# Patient Record
Sex: FEMALE | Race: WHITE | NOT HISPANIC OR LATINO | Employment: UNEMPLOYED | URBAN - METROPOLITAN AREA
[De-identification: names, ages, dates, MRNs, and addresses within clinical notes are randomized per-mention and may not be internally consistent; named-entity substitution may affect disease eponyms.]

---

## 2020-06-03 ENCOUNTER — HOSPITAL ENCOUNTER (INPATIENT)
Facility: HOSPITAL | Age: 30
LOS: 3 days | Discharge: HOME | End: 2020-06-06
Attending: OBSTETRICS & GYNECOLOGY | Admitting: OBSTETRICS & GYNECOLOGY

## 2020-06-03 ENCOUNTER — ANESTHESIA (OUTPATIENT)
Dept: LABOR AND DELIVERY | Facility: HOSPITAL | Age: 30
End: 2020-06-03

## 2020-06-03 ENCOUNTER — ANESTHESIA EVENT (OUTPATIENT)
Dept: LABOR AND DELIVERY | Facility: HOSPITAL | Age: 30
End: 2020-06-03

## 2020-06-03 PROBLEM — Z34.90 TERM PREGNANCY: Status: ACTIVE | Noted: 2020-06-03

## 2020-06-03 LAB
ABO GROUP BLD: NORMAL
BASOPHILS # BLD AUTO: 0 10*3/MM3 (ref 0–0.2)
BASOPHILS NFR BLD AUTO: 0.2 % (ref 0–1.5)
BLD GP AB SCN SERPL QL: POSITIVE
DEPRECATED RDW RBC AUTO: 41.1 FL (ref 37–54)
EOSINOPHIL # BLD AUTO: 0 10*3/MM3 (ref 0–0.4)
EOSINOPHIL NFR BLD AUTO: 0.5 % (ref 0.3–6.2)
ERYTHROCYTE [DISTWIDTH] IN BLOOD BY AUTOMATED COUNT: 13.2 % (ref 12.3–15.4)
HCT VFR BLD AUTO: 38.4 % (ref 34–46.6)
HGB BLD-MCNC: 13.2 G/DL (ref 12–15.9)
LYMPHOCYTES # BLD AUTO: 1.9 10*3/MM3 (ref 0.7–3.1)
LYMPHOCYTES NFR BLD AUTO: 20.2 % (ref 19.6–45.3)
MCH RBC QN AUTO: 30.1 PG (ref 26.6–33)
MCHC RBC AUTO-ENTMCNC: 34.4 G/DL (ref 31.5–35.7)
MCV RBC AUTO: 87.6 FL (ref 79–97)
MONOCYTES # BLD AUTO: 0.6 10*3/MM3 (ref 0.1–0.9)
MONOCYTES NFR BLD AUTO: 6.7 % (ref 5–12)
NEUTROPHILS # BLD AUTO: 6.7 10*3/MM3 (ref 1.7–7)
NEUTROPHILS NFR BLD AUTO: 72.4 % (ref 42.7–76)
NRBC BLD AUTO-RTO: 0 /100 WBC (ref 0–0.2)
PLATELET # BLD AUTO: 327 10*3/MM3 (ref 140–450)
PMV BLD AUTO: 7.3 FL (ref 6–12)
RBC # BLD AUTO: 4.39 10*6/MM3 (ref 3.77–5.28)
RESIDUAL RHIG DETECTED: NORMAL
RH BLD: NEGATIVE
RPR SER QL: NORMAL
T&S EXPIRATION DATE: NORMAL
WBC NRBC COR # BLD: 9.3 10*3/MM3 (ref 3.4–10.8)

## 2020-06-03 PROCEDURE — 25010000002 PENICILLIN G POTASSIUM PER 600000 UNITS: Performed by: OBSTETRICS & GYNECOLOGY

## 2020-06-03 PROCEDURE — 86900 BLOOD TYPING SEROLOGIC ABO: CPT

## 2020-06-03 PROCEDURE — 86901 BLOOD TYPING SEROLOGIC RH(D): CPT | Performed by: OBSTETRICS & GYNECOLOGY

## 2020-06-03 PROCEDURE — C1755 CATHETER, INTRASPINAL: HCPCS | Performed by: ANESTHESIOLOGY

## 2020-06-03 PROCEDURE — 86850 RBC ANTIBODY SCREEN: CPT | Performed by: OBSTETRICS & GYNECOLOGY

## 2020-06-03 PROCEDURE — 86900 BLOOD TYPING SEROLOGIC ABO: CPT | Performed by: OBSTETRICS & GYNECOLOGY

## 2020-06-03 PROCEDURE — 86592 SYPHILIS TEST NON-TREP QUAL: CPT | Performed by: OBSTETRICS & GYNECOLOGY

## 2020-06-03 PROCEDURE — 86870 RBC ANTIBODY IDENTIFICATION: CPT | Performed by: OBSTETRICS & GYNECOLOGY

## 2020-06-03 PROCEDURE — 86901 BLOOD TYPING SEROLOGIC RH(D): CPT

## 2020-06-03 PROCEDURE — 85025 COMPLETE CBC W/AUTO DIFF WBC: CPT | Performed by: OBSTETRICS & GYNECOLOGY

## 2020-06-03 RX ORDER — OXYTOCIN-SODIUM CHLORIDE 0.9% IV SOLN 30 UNIT/500ML 30-0.9/5 UT/ML-%
2 SOLUTION INTRAVENOUS
Status: DISCONTINUED | OUTPATIENT
Start: 2020-06-03 | End: 2020-06-04

## 2020-06-03 RX ORDER — SODIUM CHLORIDE 0.9 % (FLUSH) 0.9 %
3-10 SYRINGE (ML) INJECTION AS NEEDED
Status: DISCONTINUED | OUTPATIENT
Start: 2020-06-03 | End: 2020-06-04

## 2020-06-03 RX ORDER — ACETAMINOPHEN 325 MG/1
650 TABLET ORAL EVERY 4 HOURS PRN
Status: DISCONTINUED | OUTPATIENT
Start: 2020-06-03 | End: 2020-06-04

## 2020-06-03 RX ORDER — ONDANSETRON 4 MG/1
4 TABLET, FILM COATED ORAL EVERY 6 HOURS PRN
Status: DISCONTINUED | OUTPATIENT
Start: 2020-06-03 | End: 2020-06-04

## 2020-06-03 RX ORDER — MAGNESIUM CARB/ALUMINUM HYDROX 105-160MG
30 TABLET,CHEWABLE ORAL ONCE
Status: COMPLETED | OUTPATIENT
Start: 2020-06-03 | End: 2020-06-04

## 2020-06-03 RX ORDER — LIDOCAINE HYDROCHLORIDE 10 MG/ML
5 INJECTION, SOLUTION EPIDURAL; INFILTRATION; INTRACAUDAL; PERINEURAL AS NEEDED
Status: DISCONTINUED | OUTPATIENT
Start: 2020-06-03 | End: 2020-06-04

## 2020-06-03 RX ORDER — SODIUM CHLORIDE, SODIUM LACTATE, POTASSIUM CHLORIDE, CALCIUM CHLORIDE 600; 310; 30; 20 MG/100ML; MG/100ML; MG/100ML; MG/100ML
125 INJECTION, SOLUTION INTRAVENOUS CONTINUOUS
Status: DISCONTINUED | OUTPATIENT
Start: 2020-06-03 | End: 2020-06-04

## 2020-06-03 RX ORDER — BUTORPHANOL TARTRATE 1 MG/ML
1 INJECTION, SOLUTION INTRAMUSCULAR; INTRAVENOUS
Status: DISCONTINUED | OUTPATIENT
Start: 2020-06-03 | End: 2020-06-04

## 2020-06-03 RX ORDER — ONDANSETRON 2 MG/ML
4 INJECTION INTRAMUSCULAR; INTRAVENOUS EVERY 6 HOURS PRN
Status: DISCONTINUED | OUTPATIENT
Start: 2020-06-03 | End: 2020-06-04

## 2020-06-03 RX ORDER — ROPIVACAINE HYDROCHLORIDE 2 MG/ML
INJECTION, SOLUTION EPIDURAL; INFILTRATION; PERINEURAL
Status: DISPENSED
Start: 2020-06-03 | End: 2020-06-04

## 2020-06-03 RX ORDER — MORPHINE SULFATE 4 MG/ML
4 INJECTION, SOLUTION INTRAMUSCULAR; INTRAVENOUS
Status: DISCONTINUED | OUTPATIENT
Start: 2020-06-03 | End: 2020-06-04

## 2020-06-03 RX ORDER — SODIUM CHLORIDE 0.9 % (FLUSH) 0.9 %
3 SYRINGE (ML) INJECTION EVERY 12 HOURS SCHEDULED
Status: DISCONTINUED | OUTPATIENT
Start: 2020-06-03 | End: 2020-06-04

## 2020-06-03 RX ADMIN — Medication 3 ML: at 20:19

## 2020-06-03 RX ADMIN — OXYTOCIN 2 MILLI-UNITS/MIN: 10 INJECTION, SOLUTION INTRAMUSCULAR; INTRAVENOUS at 19:03

## 2020-06-03 RX ADMIN — SODIUM CHLORIDE, SODIUM LACTATE, POTASSIUM CHLORIDE, AND CALCIUM CHLORIDE 125 ML/HR: 600; 310; 30; 20 INJECTION, SOLUTION INTRAVENOUS at 13:30

## 2020-06-03 RX ADMIN — SODIUM CHLORIDE 5 MILLION UNITS: 900 INJECTION INTRAVENOUS at 20:11

## 2020-06-03 RX ADMIN — DINOPROSTONE 10 MG: 10 INSERT VAGINAL at 12:42

## 2020-06-03 RX ADMIN — SODIUM CHLORIDE, SODIUM LACTATE, POTASSIUM CHLORIDE, AND CALCIUM CHLORIDE 125 ML/HR: 600; 310; 30; 20 INJECTION, SOLUTION INTRAVENOUS at 16:01

## 2020-06-03 NOTE — PROGRESS NOTES
" Te  Obstetric Progress Note    Subjective   Feels mild contractions.  Cervidil has been in for approximately 5 hours.    Objective     Vitals:  Vitals:    06/03/20 1200 06/03/20 1400 06/03/20 1600 06/03/20 1700   BP: 124/70  99/55 119/59   Pulse: 91 78 97 94   Temp:       TempSrc:       SpO2:       Weight:       Height:         Flowsheet Rows      First Filed Value   Admission Height  165.1 cm (65\") Documented at 06/03/2020 1154   Admission Weight  131 kg (288 lb 12.8 oz) Documented at 06/03/2020 1154        No intake or output data in the 24 hours ending 06/03/20 1840    Fetal Heart Rate Assessment:   Category 1  [place]:  Irregular    Physical Exam:  General: Patient is in no acute distress    Pelvic Exam: 50%/2  -2.  AROM clear fluid.            Assessment/Plan     Active Problems:    Term pregnancy         Assessment:  1.  Intrauterine pregnancy at 38w1d gestation with reactive fetal status.    2.  labor  with ROM  3.  Obstetrical history significant for is non-contributory.  4.  GBS status: No results found for: STREPGPB    Plan:  1. Vaginal anticipated  2. Plan of care has been reviewed with patient.  3.  Risks, benefits of treatment plan have been discussed.  4.  All questions have been answered.  5.  Estimated fetal weight 6 to 7 pounds      Ainsley Rodriguez MD  6/3/2020  18:40      "

## 2020-06-03 NOTE — H&P
See scanned in H&P.  No changes needed.  BALAJI 4 in the office.  Possible  PROM versus oligohydramnios.

## 2020-06-04 PROBLEM — Z34.90 TERM PREGNANCY: Status: RESOLVED | Noted: 2020-06-03 | Resolved: 2020-06-04

## 2020-06-04 LAB
ABO GROUP BLD: NORMAL
FETAL BLEED: NEGATIVE
NUMBER OF DOSES: NORMAL
RH BLD: NEGATIVE

## 2020-06-04 PROCEDURE — 25010000003 PENICILLIN G POTASSIUM PER 600000 UNITS: Performed by: OBSTETRICS & GYNECOLOGY

## 2020-06-04 PROCEDURE — 25010000002 RHO D IMMUNE GLOBULIN 1500 UNIT/2ML SOLUTION PREFILLED SYRINGE: Performed by: OBSTETRICS & GYNECOLOGY

## 2020-06-04 PROCEDURE — 3E0P7VZ INTRODUCTION OF HORMONE INTO FEMALE REPRODUCTIVE, VIA NATURAL OR ARTIFICIAL OPENING: ICD-10-PCS | Performed by: OBSTETRICS & GYNECOLOGY

## 2020-06-04 PROCEDURE — 0HQ9XZZ REPAIR PERINEUM SKIN, EXTERNAL APPROACH: ICD-10-PCS | Performed by: OBSTETRICS & GYNECOLOGY

## 2020-06-04 PROCEDURE — 86901 BLOOD TYPING SEROLOGIC RH(D): CPT | Performed by: OBSTETRICS & GYNECOLOGY

## 2020-06-04 PROCEDURE — 86900 BLOOD TYPING SEROLOGIC ABO: CPT | Performed by: OBSTETRICS & GYNECOLOGY

## 2020-06-04 PROCEDURE — 10907ZC DRAINAGE OF AMNIOTIC FLUID, THERAPEUTIC FROM PRODUCTS OF CONCEPTION, VIA NATURAL OR ARTIFICIAL OPENING: ICD-10-PCS | Performed by: OBSTETRICS & GYNECOLOGY

## 2020-06-04 PROCEDURE — 85461 HEMOGLOBIN FETAL: CPT | Performed by: OBSTETRICS & GYNECOLOGY

## 2020-06-04 PROCEDURE — 25010000002 ONDANSETRON PER 1 MG: Performed by: OBSTETRICS & GYNECOLOGY

## 2020-06-04 RX ORDER — ACETAMINOPHEN 325 MG/1
650 TABLET ORAL EVERY 4 HOURS PRN
Status: DISCONTINUED | OUTPATIENT
Start: 2020-06-04 | End: 2020-06-04 | Stop reason: HOSPADM

## 2020-06-04 RX ORDER — IBUPROFEN 600 MG/1
600 TABLET ORAL EVERY 6 HOURS PRN
Status: DISCONTINUED | OUTPATIENT
Start: 2020-06-04 | End: 2020-06-04 | Stop reason: HOSPADM

## 2020-06-04 RX ORDER — HYDROCORTISONE ACETATE PRAMOXINE HCL 2.5; 1 G/100G; G/100G
1 CREAM TOPICAL AS NEEDED
Status: DISCONTINUED | OUTPATIENT
Start: 2020-06-04 | End: 2020-06-06 | Stop reason: HOSPADM

## 2020-06-04 RX ORDER — METHYLERGONOVINE MALEATE 0.2 MG/ML
200 INJECTION INTRAVENOUS ONCE AS NEEDED
Status: DISCONTINUED | OUTPATIENT
Start: 2020-06-04 | End: 2020-06-04 | Stop reason: HOSPADM

## 2020-06-04 RX ORDER — PRENATAL VIT/IRON FUM/FOLIC AC 27MG-0.8MG
1 TABLET ORAL DAILY
Status: DISCONTINUED | OUTPATIENT
Start: 2020-06-04 | End: 2020-06-06 | Stop reason: HOSPADM

## 2020-06-04 RX ORDER — SODIUM CHLORIDE 0.9 % (FLUSH) 0.9 %
1-10 SYRINGE (ML) INJECTION AS NEEDED
Status: DISCONTINUED | OUTPATIENT
Start: 2020-06-04 | End: 2020-06-06 | Stop reason: HOSPADM

## 2020-06-04 RX ORDER — MISOPROSTOL 200 UG/1
800 TABLET ORAL AS NEEDED
Status: DISCONTINUED | OUTPATIENT
Start: 2020-06-04 | End: 2020-06-04 | Stop reason: HOSPADM

## 2020-06-04 RX ORDER — OXYTOCIN-SODIUM CHLORIDE 0.9% IV SOLN 30 UNIT/500ML 30-0.9/5 UT/ML-%
125 SOLUTION INTRAVENOUS CONTINUOUS PRN
Status: COMPLETED | OUTPATIENT
Start: 2020-06-04 | End: 2020-06-04

## 2020-06-04 RX ORDER — BISACODYL 10 MG
10 SUPPOSITORY, RECTAL RECTAL DAILY PRN
Status: DISCONTINUED | OUTPATIENT
Start: 2020-06-05 | End: 2020-06-06 | Stop reason: HOSPADM

## 2020-06-04 RX ORDER — LANOLIN 100 %
OINTMENT (GRAM) TOPICAL
Status: DISCONTINUED | OUTPATIENT
Start: 2020-06-04 | End: 2020-06-06 | Stop reason: HOSPADM

## 2020-06-04 RX ORDER — DOCUSATE SODIUM 100 MG/1
100 CAPSULE, LIQUID FILLED ORAL 2 TIMES DAILY
Status: DISCONTINUED | OUTPATIENT
Start: 2020-06-04 | End: 2020-06-06 | Stop reason: HOSPADM

## 2020-06-04 RX ORDER — ONDANSETRON 4 MG/1
4 TABLET, FILM COATED ORAL EVERY 8 HOURS PRN
Status: DISCONTINUED | OUTPATIENT
Start: 2020-06-04 | End: 2020-06-06 | Stop reason: HOSPADM

## 2020-06-04 RX ORDER — HYDROCODONE BITARTRATE AND ACETAMINOPHEN 5; 325 MG/1; MG/1
1 TABLET ORAL EVERY 4 HOURS PRN
Status: DISCONTINUED | OUTPATIENT
Start: 2020-06-04 | End: 2020-06-06 | Stop reason: HOSPADM

## 2020-06-04 RX ORDER — OXYTOCIN-SODIUM CHLORIDE 0.9% IV SOLN 30 UNIT/500ML 30-0.9/5 UT/ML-%
250 SOLUTION INTRAVENOUS CONTINUOUS
Status: ACTIVE | OUTPATIENT
Start: 2020-06-04 | End: 2020-06-04

## 2020-06-04 RX ORDER — CARBOPROST TROMETHAMINE 250 UG/ML
250 INJECTION, SOLUTION INTRAMUSCULAR AS NEEDED
Status: DISCONTINUED | OUTPATIENT
Start: 2020-06-04 | End: 2020-06-04 | Stop reason: HOSPADM

## 2020-06-04 RX ORDER — OXYTOCIN-SODIUM CHLORIDE 0.9% IV SOLN 30 UNIT/500ML 30-0.9/5 UT/ML-%
999 SOLUTION INTRAVENOUS ONCE
Status: DISCONTINUED | OUTPATIENT
Start: 2020-06-04 | End: 2020-06-04 | Stop reason: HOSPADM

## 2020-06-04 RX ORDER — IBUPROFEN 600 MG/1
600 TABLET ORAL EVERY 6 HOURS PRN
Status: DISCONTINUED | OUTPATIENT
Start: 2020-06-04 | End: 2020-06-06 | Stop reason: HOSPADM

## 2020-06-04 RX ADMIN — SODIUM CHLORIDE 2.5 MILLION UNITS: 9 INJECTION, SOLUTION INTRAVENOUS at 03:47

## 2020-06-04 RX ADMIN — SODIUM CHLORIDE 2.5 MILLION UNITS: 9 INJECTION, SOLUTION INTRAVENOUS at 00:10

## 2020-06-04 RX ADMIN — BENZOCAINE 57 SPRAY: 11.4 AEROSOL, SPRAY TOPICAL at 07:37

## 2020-06-04 RX ADMIN — DOCUSATE SODIUM 100 MG: 100 CAPSULE, LIQUID FILLED ORAL at 09:39

## 2020-06-04 RX ADMIN — PRENATAL VIT W/ FE FUMARATE-FA TAB 27-0.8 MG 1 TABLET: 27-0.8 TAB at 09:39

## 2020-06-04 RX ADMIN — IBUPROFEN 600 MG: 600 TABLET, FILM COATED ORAL at 13:18

## 2020-06-04 RX ADMIN — OXYTOCIN 125 ML/HR: 10 INJECTION, SOLUTION INTRAMUSCULAR; INTRAVENOUS at 06:47

## 2020-06-04 RX ADMIN — HUMAN RHO(D) IMMUNE GLOBULIN 1500 UNITS: 1500 SOLUTION INTRAMUSCULAR; INTRAVENOUS at 15:47

## 2020-06-04 RX ADMIN — WITCH HAZEL 1 PAD: 500 SOLUTION RECTAL; TOPICAL at 07:36

## 2020-06-04 RX ADMIN — ONDANSETRON 4 MG: 2 INJECTION INTRAMUSCULAR; INTRAVENOUS at 02:10

## 2020-06-04 RX ADMIN — MINERAL OIL 30 ML: 1000 SOLUTION ORAL at 05:38

## 2020-06-04 RX ADMIN — SODIUM CHLORIDE, SODIUM LACTATE, POTASSIUM CHLORIDE, AND CALCIUM CHLORIDE 125 ML/HR: 600; 310; 30; 20 INJECTION, SOLUTION INTRAVENOUS at 00:03

## 2020-06-04 RX ADMIN — IBUPROFEN 600 MG: 600 TABLET, FILM COATED ORAL at 19:59

## 2020-06-04 NOTE — L&D DELIVERY NOTE
Te  Vaginal Delivery Note    Diagnosis     Patient is a 30 y.o. female  currently at 38w0d, who presents with oligohydramnios and induction of labor..      Delivery     Delivery:  Spontaneous Vaginal Delivery    Date of Delivery:  2020   Anesthesia:    Epidural for pain management   Delivering clinician: Ainsley Rodriguez MD      Delivery narrative: Ms. Gomez is a 30-year-old -0-1-1 female with an intrauterine pregnancy at 38 weeks who presented to the office today for her routine prenatal visit.  She was stating she had increased vaginal discharge and her BALAJI was 4.  Because she was term and had this, is decided to proceed with induction of labor.  Cervidil was placed for approximately 6 hours.  After that, artificial rupture membranes occurred and then Pitocin was started.  She received an epidural for pain management.  She reached complete cervical dilatation at 03 52 on 2020.  Began pushing at 04 52 as she had no sensation to push prior to then.  She delivered a viable male infant over an intact perineum at 05 11.  The placenta delivered 0515 it appeared intact there is a three-vessel cord.  Apgars were 9 and 9.  Position was left occiput anterior position.  There was terminal meconium but no nuchal cord.  Throughout the first and second stages labor overall the heart tracing was reassuring, some bouts of category 2 but resolved to category 1 with oxygen and position changes.  There were no complications and mom and baby are doing well at the time of this dictation.  Patient is group B strep positive and did receive antibiotics in labor thank you    Infant    Findings: VMI     Apgars:  9 and 9 at 1 and 5 minutes.      Placenta, Cord, and Fluid    Placenta delivered  spontaneous  3VC          Lacerations       had a 1st degree lacteration, which was repair. with 4-0 chromic.  usual fashion.     Estimated Blood Loss 300cc     Complications  none    Disposition  Mother to Mother  Baby/Postpartum  in stable condition currently.  Baby to remains with mom  in stable condition currently.      Ainsley Rodriguez MD  06/04/20  05:25

## 2020-06-04 NOTE — ANESTHESIA PREPROCEDURE EVALUATION
Anesthesia Evaluation                  Airway   Mallampati: II  TM distance: >3 FB  Neck ROM: full  No difficulty expected  Dental      Pulmonary    Cardiovascular         Neuro/Psych  (+) psychiatric history Anxiety,     GI/Hepatic/Renal/Endo      Musculoskeletal     Abdominal    Substance History      OB/GYN    (+) Pregnant,         Other                        Anesthesia Plan    ASA 3     epidural       Anesthetic plan, all risks, benefits, and alternatives have been provided, discussed and informed consent has been obtained with: patient.

## 2020-06-04 NOTE — PROGRESS NOTES
" Te  Obstetric Progress Note    Subjective   Comfortable    Objective     Vitals:  Vitals:    06/04/20 0200 06/04/20 0230 06/04/20 0300 06/04/20 0330   BP: 115/67 111/44 108/45 103/43   Pulse: 106 101 95 82   Resp:       Temp:       TempSrc:       SpO2:       Weight:       Height:         Flowsheet Rows      First Filed Value   Admission Height  165.1 cm (65\") Documented at 06/03/2020 1145   Admission Weight  131 kg (288 lb 12.8 oz) Documented at 06/03/2020 1145        No intake or output data in the 24 hours ending 06/04/20 0400    Fetal Heart Rate Assessment:   Category 1  [place]:  Every 2 to 3 minutes    Physical Exam:  General: Patient is in no acute distress    Pelvic Exam: Complete and +2            Assessment/Plan     Active Problems:    Term pregnancy         Assessment:  1.  Intrauterine pregnancy at 38w2d gestation with reactive fetal status.    2.  labor  with ROM  3.  Obstetrical history significant for is non-contributory.  4.  GBS status: No results found for: STREPGPB    Plan:  1. Vaginal anticipated  2. Plan of care has been reviewed with patient.  3.  Risks, benefits of treatment plan have been discussed.  4.  All questions have been answered.  5.  Estimated fetal weight 7 pounds.      Ainsley Rodriguez MD  6/4/2020  04:00      "

## 2020-06-04 NOTE — ANESTHESIA POSTPROCEDURE EVALUATION
Patient: Rosalee Gomez    Procedure Summary     Date:  06/03/20 Room / Location:      Anesthesia Start:  2325 Anesthesia Stop:  06/04/20 0511    Procedure:  LABOR ANALGESIA Diagnosis:      Scheduled Providers:   Provider:  Berry Kent MD    Anesthesia Type:  epidural ASA Status:  3          Anesthesia Type: epidural    Vitals  Vitals Value Taken Time   /64 6/4/2020  6:01 AM   Temp 97.6 °F (36.4 °C) 6/4/2020 12:00 AM   Pulse 100 6/4/2020  6:01 AM   Resp     SpO2 98 % 6/4/2020  5:50 AM   Vitals shown include unvalidated device data.        Post Anesthesia Care and Evaluation    Patient location during evaluation: ICU  Patient participation: complete - patient cannot participate  Level of consciousness: obtunded/minimal responses  Pain scale: See nurse's notes for pain score.  Pain management: adequate  Airway patency: patent  Anesthetic complications: No anesthetic complications  PONV Status: none  Cardiovascular status: acceptable  Respiratory status: acceptable  Hydration status: acceptable  Post Neuraxial Block status: Motor and sensory function returned to baseline and No signs or symptoms of PDPH  Comments: Patient seen and examined postoperatively; vital signs stable; SpO2 greater than or equal to 90%; cardiopulmonary status stable; nausea/vomiting adequately controlled; pain adequately controlled; no apparent anesthesia complications; patient discharged from anesthesia care when discharge criteria were met

## 2020-06-04 NOTE — SIGNIFICANT NOTE
Patient sitting for epidural at this time. Due to patient position and size, FHR monitor and TOCO were not tracing.

## 2020-06-04 NOTE — ANESTHESIA PROCEDURE NOTES
Labor Epidural    Pre-sedation assessment completed: 6/3/2020 11:36 PM    Patient reassessed immediately prior to procedure    Patient location during procedure: OB  Indication:at surgeon's request  Performed By  Anesthesiologist: Jimenez Will MD  Preanesthetic Checklist  Completed: patient identified, site marked, surgical consent, pre-op evaluation, timeout performed, IV checked, risks and benefits discussed and monitors and equipment checked  Additional Notes  Infusion started at 10 cc/hr of ropi 0.2% with 100 mcg fentanyl per 100 cc  Prep:  Pt Position:sitting  Sterile Tech:cap, gloves, mask and sterile barrier  Prep:povidone-iodine 7.5% surgical scrub  Monitoring:blood pressure monitoring, continuous pulse oximetry and EKG  Epidural Block Procedure:  Approach:midline  Guidance:landmark technique  Location:L2-L3  Needle Type:Tuohy  Needle Gauge:17 G  Loss of Resistance Medium: saline  Loss of Resistance: 9cm  Cath Depth at skin:15 cm  Paresthesia: none  Aspiration:negative  Test Dose:negative  Number of Attempts: 2  Post Assessment:  Dressing:occlusive dressing applied and secured with tape  Pt Tolerance:patient tolerated the procedure well with no apparent complications  Complications:no

## 2020-06-04 NOTE — PAYOR COMM NOTE
"Rosalee Gomez (30 y.o. Female)     Date of Birth Social Security Number Address Home Phone MRN    1990  [id number] [place] [address]  [id number]    [employer] Marital Status          None        Admission Date Admission Type Admitting Provider Attending Provider Department, Room/Bed    6/3/20 Elective Ainsley Rodriguez MD Riely, Jacqueline H, MD [facility] MOTHER BABY, M414/1    Discharge Date Discharge Disposition Discharge Destination                       Attending Provider:  Ainsley Rodriguez MD    Allergies:  No Known Allergies    Isolation:  None   Infection:  None   Code Status:  CPR    Ht:  165.1 cm (65\")   Wt:  131 kg (288 lb 12.8 oz)    Admission Cmt:  None   Principal Problem:  None                Active Insurance as of 6/3/2020     Primary Coverage     Payor Plan Insurance Group Employer/Plan Group    [employer]HEM BLUE CROSS ANTHEM BLUE CROSS BLUE SHIELD PPO [id number]     Payor Plan Address Payor Plan Phone Number Payor Plan Fax Number Effective Dates    [address] [phone]  3/1/2020 - None Entered    [facility] [id number]       Subscriber Name Subscriber Birth Date Member ID       GISELE GOMEZ 1988 [id number]                 Emergency Contacts      (Rel.) Home Phone Work Phone Mobile Phone    GISELE GOMEZ (Spouse) [phone] -- --               History & Physical      Ainsley Rodriguez MD at 20 1840        See scanned in H&P.  No changes needed.  BALAJI 4 in the office.  Possible  PROM versus oligohydramnios.    Electronically signed by Ainsley Rodriguez MD at 20 1840          Operative/Procedure Notes (last 48 hours) (Notes from 20 0836 through 20 0836)      Ainsley Rodriguez MD at 20 0524          [facility]  Vaginal Delivery Note    Diagnosis     Patient is a 30 y.o. female  currently at 38w0d, who presents with oligohydramnios and induction of labor..      Delivery     Delivery:  Spontaneous " Vaginal Delivery    Date of Delivery:  2020   Anesthesia:    Epidural for pain management   Delivering clinician: Ainsley Rodriguez MD      Delivery narrative: Ms. Gomez is a 30-year-old -0-1-1 female with an intrauterine pregnancy at 38 weeks who presented to the office today for her routine prenatal visit.  She was stating she had increased vaginal discharge and her BALAJI was 4.  Because she was term and had this, is decided to proceed with induction of labor.  Cervidil was placed for approximately 6 hours.  After that, artificial rupture membranes occurred and then Pitocin was started.  She received an epidural for pain management.  She reached complete cervical dilatation at 03 52 on 2020.  Began pushing at 04 52 as she had no sensation to push prior to then.  She delivered a viable male infant over an intact perineum at 05 11.  The placenta delivered 0515 it appeared intact there is a three-vessel cord.  Apgars were 9 and 9.  Position was left occiput anterior position.  There was terminal meconium but no nuchal cord.  Throughout the first and second stages labor overall the heart tracing was reassuring, some bouts of category 2 but resolved to category 1 with oxygen and position changes.  There were no complications and mom and baby are doing well at the time of this dictation.  Patient is group B strep positive and did receive antibiotics in labor thank you    Infant    Findings: VMI     Apgars:  9 and 9 at 1 and 5 minutes.      Placenta, Cord, and Fluid    Placenta delivered  spontaneous  3VC          Lacerations       had a 1st degree lacteration, which was repair. with 4-0 chromic.  usual fashion.     Estimated Blood Loss 300cc     Complications  none    Disposition  Mother to Mother Baby/Postpartum  in stable condition currently.  Baby to remains with mom  in stable condition currently.      Ainsley Rodriguez MD  20  05:25          Electronically signed by Ainsley Rodriguez  MD at 06/04/20 0530

## 2020-06-05 LAB
BASOPHILS # BLD AUTO: 0 10*3/MM3 (ref 0–0.2)
BASOPHILS NFR BLD AUTO: 0.3 % (ref 0–1.5)
DEPRECATED RDW RBC AUTO: 41.6 FL (ref 37–54)
EOSINOPHIL # BLD AUTO: 0.1 10*3/MM3 (ref 0–0.4)
EOSINOPHIL NFR BLD AUTO: 1.4 % (ref 0.3–6.2)
ERYTHROCYTE [DISTWIDTH] IN BLOOD BY AUTOMATED COUNT: 13.4 % (ref 12.3–15.4)
HCT VFR BLD AUTO: 31.5 % (ref 34–46.6)
HGB BLD-MCNC: 10.8 G/DL (ref 12–15.9)
LYMPHOCYTES # BLD AUTO: 2.7 10*3/MM3 (ref 0.7–3.1)
LYMPHOCYTES NFR BLD AUTO: 27.3 % (ref 19.6–45.3)
MCH RBC QN AUTO: 30.4 PG (ref 26.6–33)
MCHC RBC AUTO-ENTMCNC: 34.1 G/DL (ref 31.5–35.7)
MCV RBC AUTO: 89.1 FL (ref 79–97)
MONOCYTES # BLD AUTO: 0.8 10*3/MM3 (ref 0.1–0.9)
MONOCYTES NFR BLD AUTO: 7.5 % (ref 5–12)
NEUTROPHILS # BLD AUTO: 6.4 10*3/MM3 (ref 1.7–7)
NEUTROPHILS NFR BLD AUTO: 63.5 % (ref 42.7–76)
NRBC BLD AUTO-RTO: 0.1 /100 WBC (ref 0–0.2)
PLATELET # BLD AUTO: 288 10*3/MM3 (ref 140–450)
PMV BLD AUTO: 7 FL (ref 6–12)
RBC # BLD AUTO: 3.54 10*6/MM3 (ref 3.77–5.28)
WBC NRBC COR # BLD: 10 10*3/MM3 (ref 3.4–10.8)

## 2020-06-05 PROCEDURE — 85025 COMPLETE CBC W/AUTO DIFF WBC: CPT | Performed by: OBSTETRICS & GYNECOLOGY

## 2020-06-05 RX ADMIN — DOCUSATE SODIUM 100 MG: 100 CAPSULE, LIQUID FILLED ORAL at 08:11

## 2020-06-05 RX ADMIN — DOCUSATE SODIUM 100 MG: 100 CAPSULE, LIQUID FILLED ORAL at 02:20

## 2020-06-05 RX ADMIN — DOCUSATE SODIUM 100 MG: 100 CAPSULE, LIQUID FILLED ORAL at 20:24

## 2020-06-05 RX ADMIN — IBUPROFEN 600 MG: 600 TABLET, FILM COATED ORAL at 15:14

## 2020-06-05 RX ADMIN — IBUPROFEN 600 MG: 600 TABLET, FILM COATED ORAL at 08:11

## 2020-06-05 RX ADMIN — PRENATAL VIT W/ FE FUMARATE-FA TAB 27-0.8 MG 1 TABLET: 27-0.8 TAB at 08:11

## 2020-06-05 RX ADMIN — IBUPROFEN 600 MG: 600 TABLET, FILM COATED ORAL at 23:25

## 2020-06-05 NOTE — PROGRESS NOTES
[facility]  Postpartum Note    Subjective   Postpartum Day 1:  Spontaneous Vaginal Delivery    Patient without complaints. Her pain is well controlled with nonsteroidal anti-inflammatory drugs and prescribed pain medications. She is ambulating well.  Patient describes her bleeding as thin lochia.    Breastfeeding: infant latching without difficulty.    Objective     Vitals:  Vitals:    06/04/20 1905 06/04/20 2328 06/05/20 0311 06/05/20 0800   BP: 140/83 127/72 112/72 125/78   BP Location: Left arm Left arm Left arm Left arm   Patient Position: Sitting Sitting Lying Sitting   Pulse: 95 87 87 75   Resp: 18 18 19 17   Temp: 98.1 °F (36.7 °C) 98.5 °F (36.9 °C) 98 °F (36.7 °C) 97.6 °F (36.4 °C)   TempSrc: Oral Oral Oral Oral   SpO2: 98% 98% 97% 98%   Weight:       Height:           Physical Exam:  General:  Alert and oriented x3. No acute distress.  Abdomen: abdomen is soft without significant tenderness, masses, organomegaly or guarding. Fundus: appropriate, firm, non tender  Incision: N/A  Skin: Warm, Dry  Extremities: Normal,  trace edema. Nontender     Labs:  Results from last 7 days   Lab Units 06/05/20  0710 06/03/20  1259   WBC 10*3/mm3 10.00 9.30   HEMOGLOBIN g/dL 10.8* 13.2   HEMATOCRIT % 31.5* 38.4   PLATELETS 10*3/mm3 288 327            Feeding method: Breastfeeding Status: Yes     Blood Type: RH Negative Rhogam Given         Assessment/Plan     Active Problems:    * No active hospital problems. *      Rosalee Gomez is Day 1  post-partum from a  Spontaneous Vaginal Delivery      Plan:  routine, continue present management and plan d/c tomorrow.       Christina Puente, APRN  6/5/2020  09:58

## 2020-06-05 NOTE — PLAN OF CARE
Pt tolerating pain with Motrin, voiding without difficulty, ambulating around room, resting between feeds, bonding with baby, will continue to monitor

## 2020-06-05 NOTE — PLAN OF CARE
Pt has ambulated in room tonight. She has had some mild pain in her hips and epidural site but motrin and applying a warm blanket to the site helped. She is breastfeeding baby well and is attentive to baby's needs.

## 2020-06-06 VITALS
HEIGHT: 65 IN | DIASTOLIC BLOOD PRESSURE: 62 MMHG | SYSTOLIC BLOOD PRESSURE: 115 MMHG | OXYGEN SATURATION: 99 % | TEMPERATURE: 98.4 F | HEART RATE: 92 BPM | BODY MASS INDEX: 48.12 KG/M2 | RESPIRATION RATE: 18 BRPM | WEIGHT: 288.8 LBS

## 2020-06-06 RX ORDER — IBUPROFEN 600 MG/1
600 TABLET ORAL EVERY 6 HOURS PRN
Qty: 30 TABLET | Refills: 0 | OUTPATIENT
Start: 2020-06-06 | End: 2020-12-16

## 2020-06-06 RX ADMIN — DOCUSATE SODIUM 100 MG: 100 CAPSULE, LIQUID FILLED ORAL at 08:55

## 2020-06-06 RX ADMIN — PRENATAL VIT W/ FE FUMARATE-FA TAB 27-0.8 MG 1 TABLET: 27-0.8 TAB at 08:55

## 2020-06-06 RX ADMIN — IBUPROFEN 600 MG: 600 TABLET, FILM COATED ORAL at 06:30

## 2020-06-06 NOTE — DISCHARGE SUMMARY
[facility]  Delivery Discharge Summary    Primary OB Clinician: Ainsley Rodriguez MD    Admission Diagnosis:  Active Problems:    * No active hospital problems. *      Discharge Diagnosis:  S/p ; oligohydramnios    Gestational Age: 38w0d    Date of Delivery: 2020     Delivered By:  Ainsley Rodriguez     Delivery Type: Vaginal, Spontaneous      Intrapartum Course: Uncomplicated delivery.     Postpartum Course:  Patient's postpartum course has been uncomplicated.  Her bleeding is minimal, and her pain is controlled.  She is ambulating and tolerating regular diet.  She is breast-feeding.  She is being discharged home today.  She will follow-up for her postpartum visit.      Physical Exam:    Vitals:   Vitals:    20 0800 20 1510 20 2340 20 0745   BP: 125/78 128/83 119/79 115/62   BP Location: Left arm Left arm Left arm Left arm   Patient Position: Sitting Lying Lying Lying   Pulse: 75 98 74 92   Resp: 17 17 18 18   Temp: 97.6 °F (36.4 °C) 98 °F (36.7 °C) 97.8 °F (36.6 °C) 98.4 °F (36.9 °C)   TempSrc: Oral Oral Oral Oral   SpO2: 98% 99% 97% 99%   Weight:       Height:         Temp (24hrs), Av.1 °F (36.7 °C), Min:97.8 °F (36.6 °C), Max:98.4 °F (36.9 °C)      General Appearance:    Alert, cooperative, in no acute distress   Abdomen:     Soft non-tender, non-distended, no guarding, no rebound         tenderness.   Extremities:   Moves all extremities well, no edema, no cyanosis, no             redness.   Fundus:   Firm, below umbilicus     Feeding method: Breastfeeding Status: Yes    Blood Type: RH Negative Rhogam Given       Plan:  Discharge to home.    Follow-up appointment in 6 weeks.

## 2020-06-06 NOTE — LACTATION NOTE
This note was copied from a baby's chart.  Provided mother with handouts, lansinoh and gel pads. Basic teaching done. Denies history of breast surgery. Denies wool allergy. Takes zyrtec routinely. Has an Ameda and a Medela pump at home.  1st child X11mos. Without difficulty. Declines breastfeeding DVD. Breasts filling. Nipples tender with initial latch only. Observed in football hold, baby latched wide, frequent audible swallowing. Instructed on gentle stimulation to keep baby feeding. Many topics discussed. Plans discharge today. Provided with  discharge weight ticket and lactation contact card. Encouraged to call as needed.